# Patient Record
(demographics unavailable — no encounter records)

---

## 2025-01-23 NOTE — ADDENDUM
[FreeTextEntry1] : I, Dr. Tim Chavez, personally performed the evaluation and management (E/M) services for this new patient. This includes conducting the clinically appropriate initial history &/or physical exam, assessing all conditions, and establishing the care plan. My NP, Eileen Fortune, was here to observe my E/M services for this patient.I, Dr. Tim Chavez, personally performed the evaluation and management (E/M) services for this new patient. This includes conducting the clinically appropriate initial history &/or physical exam, assessing all conditions, and establishing the care plan. My NP, Eileen Fortune, was here to observe my E/M services for this patient.

## 2025-01-23 NOTE — HISTORY OF PRESENT ILLNESS
[FreeTextEntry1] : Name BHARGAV ARAIZA  MRN [id number]   Dec 17 1993  -------------------------------------------------------------------------------------------------------------------------------------------  Date of First Visit:  2025 Referring Provider/PCP: ***** / ********  -------------------------------------------------------------------------------------------------------------------------------------------  CC: Gross hematuria   History of Present Illness: BHARGAV ARAIZA is a 31 year old female with no significant PMH of who presents for evaluation of gross hematuria. Noticed blood in the urine last week after having a bowel movement. Hematuria has persisted, tends to be darker red if she is also having a bowel movement, and is more pink tinged without straining of a bowel movement. Most noticeable at the end of urination. She has a history of gross hematuria and had a negative workup in early . States a cysto showed inflammation.   LUTS: No   Cigarette smoking: never smoker  History of kidney stones: No  History of UTI or STI: No Recent trauma or strenuous activity: No History of chronic indwelling catheters: No  Family history of urothelial or other genitourinary cancers: No History of occupational exposures (chemical benzene or aromatic amines): No   Personal history of cancer: No History of cyclophosphamide/ifosfamide chemotherapy: No  History of pelvic radiation: No

## 2025-01-23 NOTE — ASSESSMENT
[FreeTextEntry1] : Assessment:    BHARGAV ARAIZA is a 31 year old female with gross hematuria.   The patient and I discussed the finding of hematuria. We discussed the fact that the differential diagnosis can include both benign, inflammatory and malignant etiologies such as prostatic bleeding in males, urolithiasis, malignancy, infection, medical kidney disease, hemoglobinuria and idiopathic etiologies. We discussed the fact that the workup dictates evaluation for both upper tract and lower urinary tract sources for bleeding. Upper tract evaluation includes imaging studies to assess renal and ureteral anatomy and the lower tract evaluation requires cystoscopy for direct visualization of the bladder and urethra. We will have the patient return for upper tract imaging and cystoscopy.    Plan:  -UA, UCx, urine cytology  -MR urogram  -Cystoscopy

## 2025-02-28 NOTE — HISTORY OF PRESENT ILLNESS
[FreeTextEntry1] : Name BHARGAV ARAIZA MRN [id number]  Dec 17 1993 ------------------------------------------------------------------------------------------------------------------------------------------- Date of First Visit: 2025 Referring Provider/PCP: ***** / ******** ------------------------------------------------------------------------------------------------------------------------------------------- CC: Gross hematuria  History of Present Illness: BHARGAV ARAIZA is a 31 year old female with no significant PMH of who presents for evaluation of gross hematuria. Noticed blood in the urine last week after having a bowel movement. Hematuria has persisted, tends to be darker red if she is also having a bowel movement, and is more pink tinged without straining of a bowel movement. Most noticeable at the end of urination. She has a history of gross hematuria and had a negative workup in early . States a cysto showed inflammation.  LUTS: No Cigarette smoking: never smoker History of kidney stones: No History of UTI or STI: No Recent trauma or strenuous activity: No History of chronic indwelling catheters: No Family history of urothelial or other genitourinary cancers: No History of occupational exposures (chemical benzene or aromatic amines): No Personal history of cancer: No History of cyclophosphamide/ifosfamide chemotherapy: No History of pelvic radiation: No ------------------------------------------------------------------------------------------------------------------------------------------- Interval History (2025): Labs from 25 reviewed: neg UCx, neg cytology, neg UA (large blood but 0 rbc/hpf). MR Urogram 25: Unremarkable - "No suspcious renal, ureteral, or bladder masses." Cystoscopy today is largely unremarkable with some very mild trigonitis (see procedure note for full details).  She notes the episodes of GH tend to occur with significant bearing down as she deals with constipation.

## 2025-02-28 NOTE — ASSESSMENT
[FreeTextEntry1] : Assessment: BHARGAV ARAIZA is a 31-year-old female with painless gross hematuria s/p negative workup. Bleeding tends to occur with bearing down d/t constipation.   Plan: -Advised to undergo repeat UA with micro within 1 year - given absence of risk factors, will discontinue further evaluation if negative UA